# Patient Record
(demographics unavailable — no encounter records)

---

## 2024-10-08 NOTE — CONSULT LETTER
[Dear  ___] : Dear  [unfilled], [Courtesy Letter:] : I had the pleasure of seeing your patient, [unfilled], in my office today. [Please see my note below.] : Please see my note below. [Consult Closing:] : Thank you very much for allowing me to participate in the care of this patient.  If you have any questions, please do not hesitate to contact me. [Sincerely,] : Sincerely, [FreeTextEntry2] : Dr. Stanley Levin  [address] [place] [address] [address], [place] [address]   [phone] [FreeTextEntry3] : Shanthi Blount MD Pediatric Otolaryngology / Head and Neck Surgery    [facility] [address] [place] [address] Tel [phone] Fax [phone]    1 [facility] [address] [place] [address]  Tel [phone] Fax [phone]

## 2024-10-08 NOTE — HISTORY OF PRESENT ILLNESS
[de-identified] : Today I had the pleasure of seeing NILSON SALEEM for follow up.  History was obtained from patient, mother and chart.  Hx of ADHD and asthma- well controlled (missael Lou NP) PSG 9/20/24 Very mild BUCK. Sleep efficiency 87.5%, oAHI 1.1/hr, nissa 92%.   Snoring remains the same, worse on her back  Occasional gasping

## 2024-10-08 NOTE — ASSESSMENT
[FreeTextEntry1] : NILSON is a 7 year old girl presenting for asthma control, nasal congestion, sleep disordered breathing  Nasal Congestion, sleep disordered breathing mild BUCK - discussed options plan for observation and follow up in 6 months if persists offered T&A, will reach out to pulm to see if candidate for singulair as well  Consent for Tonsillectomy and Adenoidectomy The risks, benefits and alternatives of tonsillectomy and adenoidectomy were discussed.   The risks of tonsillectomy include but are not limited to: bleeding, which can range from mild requiring observation to more serious or life-threatening bleeding necessitating hospitalization, blood transfusion, and/or return to the operating room for control; voice change, infection, pain, dehydration, swallowing difficulty, need for additional surgery, nasal regurgitation and risk of anesthesia (which will be discussed by the anesthesiologist). Benefits in the case of recurrent tonsillopharyngitis include a reduction (but not necessarily a complete cure) in the number of throat infections, and in the case of obstructive sleep apnea (BUCK) include a decrease in severity of BUCK, which can be curative, but in many cases residual BUCK may occur. Alternatives in the case of recurrent tonsillopharyngitis include observation and continued antibiotic treatment, and in the case of BUCK observation, medical therapy, Continuous Positive Airway Pressure(CPAP), Bilevel Positive Airway Pressure (BiPAP) other surgical options. Non-treatment of BUCK is associated with decreased sleep and its sequelae, and in severe cases can have cardiovascular complications.  The risks, benefits and alternatives of adenoidectomy were discussed. The risks include but are not limited to: bleeding, which can range from mild requiring observation to more serious necessitating hospitalization, blood transfusion, return to the operating room for control and in extreme cases death; voice change- specifically velopharyngeal insufficiency which can affect the nasal resonance; infection, pain, dehydration, swallowing difficulty, need for additional surgery, nasal regurgitation and risk of anesthesia (which will be discussed by the anesthesiologist). Benefits in the case of recurrent adenoiditis include a reduction (but not necessarily a complete cure) in the number of adenoid infections; in the case of nasal obstruction an improvement of nasal airway and decreased rhinorrhea; and in the case of obstructive sleep apnea (BUCK) include a decrease in severity of BUCK, which can be curative, but in many cases residual BUCK may occur. Alternatives in the case of recurrent adenoiditis include observation and continued antibiotic treatment; in the case of nasal obstruction observation or medical therapy including but not limited to antihistamines, intranasal/systemic steroids; and in the case of BUCK observation, medical therapy, Continuous Positive Airway Pressure(CPAP), Bilevel Positive Airway Pressure (BiPAP) other surgical options. Non-treatment of BUCK is associated with decreased sleep and its sequelae, and in severe cases can have cardiovascular complications.

## 2024-10-08 NOTE — PHYSICAL EXAM
[Partial] : partial cerumen impaction [Effusion] : no effusion [Exposed Vessel] : left anterior vessel not exposed [Increased Work of Breathing] : no increased work of breathing with use of accessory muscles and retractions [Normal Gait and Station] : normal gait and station [Normal muscle strength, symmetry and tone of facial, head and neck musculature] : normal muscle strength, symmetry and tone of facial, head and neck musculature [Normal] : no cervical lymphadenopathy [de-identified] : 2+ endophytic

## 2024-10-31 NOTE — REVIEW OF SYSTEMS
[NI] : Genitourinary  [Nl] : Hematologic/Lymphatic [Snoring] : snoring [Wheezing] : wheezing [Cough] : cough [Hyperactive] : hyperactive behavior [Immunizations are up to date] : Immunizations are up to date [Fever] : no fever [Apnea] : no apnea [Nasal Congestion] : nasal congestion [Heart Disease] : no heart disease [Shortness of Breath] : no shortness of breath [Pneumonia] : no pneumonia [Problems Swallowing] : no problems swallowing [Eczema] : no ezcema [FreeTextEntry6] : no current wheeze. +infrequent cough [de-identified] : obese [Influenza Vaccine this Past Year] : no influenza vaccine this past year

## 2024-10-31 NOTE — BIRTH HISTORY
[At Term] : at term [de-identified] : NICU stay x1 month due to maternal opiod use. No resp distress.  [FreeTextEntry1] : Adopted at birth.

## 2024-10-31 NOTE — SOCIAL HISTORY
[Mother] : mother [Grade:  _____] : Grade: [unfilled] [House] : [unfilled] lives in a house  [Cat] : cat [Bedroom] : not in the bedroom [Living Area] : not in the living area [Smokers in Household] : there are no smokers in the home [de-identified] : 1 cat

## 2024-10-31 NOTE — PHYSICAL EXAM
[Alert] : ~L alert [Active] : active [Normal Breathing Pattern] : normal breathing pattern [No Respiratory Distress] : no respiratory distress [No Nasal Drainage] : no nasal drainage [No Oral Cyanosis] : no oral cyanosis [No Stridor] : no stridor [Absence Of Retractions] : absence of retractions [Symmetric] : symmetric [Good Expansion] : good expansion [No Acc Muscle Use] : no accessory muscle use [Good aeration to bases] : good aeration to bases [Equal Breath Sounds] : equal breath sounds bilaterally [No Crackles] : no crackles [No Rhonchi] : no rhonchi [No Wheezing] : no wheezing [Normal Sinus Rhythm] : normal sinus rhythm [Soft, Non-Tender] : soft, non-tender [Full ROM] : full range of motion [No Clubbing] : no clubbing [Capillary Refill < 2 secs] : capillary refill less than two seconds [No Cyanosis] : no cyanosis [Abnormal Walk] : normal gait [Alert and  Oriented] : alert and oriented [FreeTextEntry1] : obese [FreeTextEntry5] : +postnasal drip.  [FreeTextEntry7] : infrequent cough.

## 2024-10-31 NOTE — HISTORY OF PRESENT ILLNESS
[FreeTextEntry1] : Asthma, ADHD, obesity, snoring and chronic nasal congestion. Non-allergic rhinitis (SPT negative, May 2024).  Adopted at birth. Biological family hx not known. NICU stay x 1 month (opioid weaning). PS2024: mild BUCK: AHI: 1.1/hr; O2 Roni: 92%.  ENT eval 10/2024, Dr. Blount: possible T&A, consider Singulair.    FOLLOW UP - Oct 31, 2024 Last seen 2024, Dr. Bernal: Recs:  ICS stepped up to: Fluticasone Propionate HFA 110mcg 2 puffs BID, OCS, ABX for AOM, PSG  Interval hx: - Overall doing well until 3 days ago. +cough and nasal congestion. Denies h/o fever.  - reports compliance with ICS and spacer.  - Albuterol PRN, about every 4-6 hours during the day (not used overnight). Last dose today at 8am.  - No interval oral steroids. - No interval ER visits/hospitalizations. - ENT eval 10/2024 with Dr. Blount: possible T&A, consider Singulair. mother open to trying singulair.   Daily meds: Fluticasone Propionate HFA 110mcg 2 puffs BID  Rescue meds: albuterol PRN.  Baseline daytime cough, SOB or wheeze: denies. well between episodes.  Baseline nocturnal cough, SOB or wheeze: denies. well between episodes.  Exertional cough, SOB or wheeze: denies (swim class without issue).  BUCK sx: +snoring. PSG mild BUCK. Follows with ENT.  DANIEL sx: denies  Allergic rhinitis symptoms:  denies  Flu Vaccine - :  Pending for -  season. will schedule with PCP when feeling better.   Modified Asthma Predictive Index (mAPI): 4 wheezing illnesses AND 1 major criteria Parental asthma: unknown biologic family hx.  atopic dermatitis: NO Aeroallergen sensitization suspected: YES   OR   2 minor criteria Food sensitization: NO Peripheral blood eosinophilia =4%: Wheezing apart from colds:  NO

## 2024-12-12 NOTE — PHYSICAL EXAM
[Alert] : alert [No Acute Distress] : no acute distress [Cooperative] : cooperative [Normocephalic] : normocephalic [Conjunctivae with no discharge] : conjunctivae with no discharge [PERRL] : PERRL [Clear Tympanic membranes with present light reflex and bony landmarks] : clear tympanic membranes with present light reflex and bony landmarks [Uvula Midline] : uvula midline [Nonerythematous Oropharynx] : nonerythematous oropharynx [Supple, full passive range of motion] : supple, full passive range of motion [No Palpable Masses] : no palpable masses [Regular Rate and Rhythm] : regular rate and rhythm [Normal S1, S2 present] : normal S1, S2 present [No Murmurs] : no murmurs [Soft] : soft [NonTender] : non tender [Normoactive Bowel Sounds] : normoactive bowel sounds [Mio: _____] : Mio [unfilled] [No pain or deformities with palpation of bone, muscles, joints] : no pain or deformities with palpation of bone, muscles, joints [Normal Muscle Tone] : normal muscle tone [Straight] : straight [No Rash or Lesions] : no rash or lesions [FreeTextEntry9] : obese abdomen [FreeTextEntry1] : pleasant, slightly inattentive [FreeTextEntry4] : pale nasal turbinates, clear rhinorrhea [de-identified] : multiple caries; tonsils 2+ bilaterally  [FreeTextEntry7] : breathing comfortably; crackles and faint wheezing over R lung (no change after coughing) [de-identified] : increased adipose tissue [de-identified] : grossly normal  [de-identified] : no active eczema

## 2024-12-12 NOTE — DISCUSSION/SUMMARY
[Normal Sleep Pattern] : sleep [Excessive Weight Gain] : excessive weight gain [BMI ___] : body mass index of [unfilled] [Delayed Fine Motor Skills] : delayed fine motor skills [Delayed Language Skills] : delayed language skills [Picky Eater] : picky eater [Patient] : patient [Mother] : mother [Full Activity without restrictions including Physical Education & Athletics] : Full Activity without restrictions including Physical Education & Athletics [I have examined the above-named student and completed the preparticipation physical evaluation. The athlete does not present apparent clinical contraindications to practice and participate in sport(s) as outlined above. A copy of the physical exam is on r] : I have examined the above-named student and completed the preparticipation physical evaluation. The athlete does not present apparent clinical contraindications to practice and participate in sport(s) as outlined above. A copy of the physical exam is on record in my office and can be made available to the school at the request of the parents. If conditions arise after the athlete has been cleared for participation, the physician may rescind the clearance until the problem is resolved and the potential consequences are completely explained to the athlete (and parents/guardians). [FreeTextEntry3] : FAMILY CARDIAC HISTORY REVIEWED (cardiac screen negative) [] : The components of the vaccine(s) to be administered today are listed in the plan of care. The disease(s) for which the vaccine(s) are intended to prevent and the risks have been discussed with the caretaker.  The risks are also included in the appropriate vaccination information statements which have been provided to the patient's caregiver.  The caregiver has given consent to vaccinate. [FreeTextEntry1] :  Nancie 7 year old girl with autism, mild developmental delays, ADHD, mild persistent asthma, SDB (very mild BUCK on recent PSG), and strabismus  Hx of multiple UTIs in 2023 (1 of which was febrile), likely due to urine withholding and chronic constipation; renal U/S and EMG flow study were normal, constipation is well-managed with miralax daily Excessive weight gain of 16 lb in the last year, clearly due to excess caloric intake and inadequate physical activity; BMI remains above 99%ile Compliant with Fluticasone 110mcg 2 puffs BID (s/p ICS step-up in July) and Montelukast (as recommended by ENT and Pulm); asthma overall well-controlled, no oral steroid courses since July Acute cough for 5 days in context of afebrile URI; exam findings are c/w clinical PNA of R lung Exam also notable for pale nasal mucosa; highly suspect allergic rhinitis despite negative SPT as child had taken oral antihistamine prior to testing  1) Health maintenance - Discussed child safety - Routine F/U with Dentist and Ophtho - Flu & COVID vaccines administered by RN  2) Autism/ADHD - Continue school-based therapies (ST, OT, counseling) - F/U with B&D (overdue)  3) Mild persistent asthma (well-controlled) - Continue Fluticasone 110mcg 2 puffs BID and Singulair 5 mg QHS - Take Albuterol q4h PRN, initiate at the beginning of URI - F/U with Pulm in Jan 2025  4) Obesity - Extensive dietary and exercise counseling provided - Screening labs ordered  - Nutritionist referral  5) Snoring/ Chronic nasal congestion - Take Claritin and Flonase PRN - Continue Singulair 5mg QHS - F/U with ENT   6) Recurrent UTIs (none in 2024) - Reviewed proper  hygiene and importance of timely voiding - Continue Miralax 1/2 capful daily for constipation - F/U with Uro PRN (parent deferred VCUG which is reasonable given normal renal U/S)  7) R lung PNA - Prescribed high-dose Amox 10 day course - RVP to assess for Mycoplasma or Pertussis infection - Return in 2 weeks for F/U (to ensure appropriate treatment)

## 2024-12-12 NOTE — PHYSICAL EXAM
[Alert] : alert [No Acute Distress] : no acute distress [Cooperative] : cooperative [Normocephalic] : normocephalic [Conjunctivae with no discharge] : conjunctivae with no discharge [PERRL] : PERRL [Clear Tympanic membranes with present light reflex and bony landmarks] : clear tympanic membranes with present light reflex and bony landmarks [Uvula Midline] : uvula midline [Nonerythematous Oropharynx] : nonerythematous oropharynx [Supple, full passive range of motion] : supple, full passive range of motion [No Palpable Masses] : no palpable masses [Regular Rate and Rhythm] : regular rate and rhythm [Normal S1, S2 present] : normal S1, S2 present [No Murmurs] : no murmurs [Soft] : soft [NonTender] : non tender [Normoactive Bowel Sounds] : normoactive bowel sounds [Mio: _____] : Mio [unfilled] [No pain or deformities with palpation of bone, muscles, joints] : no pain or deformities with palpation of bone, muscles, joints [Normal Muscle Tone] : normal muscle tone [Straight] : straight [No Rash or Lesions] : no rash or lesions [FreeTextEntry9] : obese abdomen [FreeTextEntry1] : pleasant, slightly inattentive [FreeTextEntry4] : pale nasal turbinates, clear rhinorrhea [de-identified] : multiple caries; tonsils 2+ bilaterally  [FreeTextEntry7] : breathing comfortably; crackles and faint wheezing over R lung (no change after coughing) [de-identified] : increased adipose tissue [de-identified] : grossly normal  [de-identified] : no active eczema

## 2024-12-12 NOTE — HISTORY OF PRESENT ILLNESS
[Yes] : Patient goes to dentist yearly [Appropiate parent-child-sibling interaction] : appropriate parent-child-sibling interaction [Has Friends] : has friends [Grade ___] : Grade [unfilled] [No] : No cigarette smoke exposure [Appropriately restrained in motor vehicle] : appropriately restrained in motor vehicle [___ stools per day] : [unfilled]  stools per day [Toilet Trained] : toilet trained [In own bed] : In own bed [Mother] : mother [Brushing teeth twice/d] : brushing teeth twice per day [Participates in after-school activities] : participates in after-school activities [Adequate social interactions] : adequate social interactions [Adequate behavior] : adequate behavior [Adequate performance] : adequate performance [Parent discusses safety rules regarding adults] : parent discusses safety rules regarding adults [Exposure to electronic nicotine delivery system] : No exposure to electronic nicotine delivery system [Up to date] : Up to date [de-identified] : nasal congestion/discharge, cough for 5 days, no fever but did take meds due to not feeling well this week, taking Flonase 1 spray 1-2x/day, Albuterol & NS nebs every 4 hours (including at school) doesn't require Albuterol overnight compliant with Fluticasone 110mcg 2 puffs BID  [de-identified] : diet consists of carbs, meat, fruits, low-fat yogurt, ice cream; drinks unsweetened almond milk and no sugar-added cranberry juice [FreeTextEntry8] : constipation is well-managed with daily miralax  [FreeTextEntry3] : sleeps well; snoring improved with consistent use of flonase  [de-identified] : awaiting sedated procedure for treatment of multiple cavities  [FreeTextEntry9] : exercise includes dance, swimming and daily play with her 6 year old nephew  [de-identified] : IEP: [employer] classroom, ST, OT, counseling; does well in school, reads very well [FreeTextEntry1] :  SDB PSG 9/20/24 very mild BUCK Persistent snoring, occasional gasping ENT appt in Oct 2024: continue observation, F/U in 6 months and consider T&A if sx persist  Mild persistent asthma ICS step-up in July to Fluticasone 110mcg 2 puffs BID, prescribed oral steroid, antibiotic for AOM Pulm appt in Oct 2024: recommend continuing Fluticasone 110mcg 2 puffs BID, intranasal steroid and/or antihistamine, trial Montelukast (as recommended by ENT), F/U in 2-3 months

## 2024-12-12 NOTE — HISTORY OF PRESENT ILLNESS
[Yes] : Patient goes to dentist yearly [Appropiate parent-child-sibling interaction] : appropriate parent-child-sibling interaction [Has Friends] : has friends [Grade ___] : Grade [unfilled] [No] : No cigarette smoke exposure [Appropriately restrained in motor vehicle] : appropriately restrained in motor vehicle [___ stools per day] : [unfilled]  stools per day [Toilet Trained] : toilet trained [In own bed] : In own bed [Mother] : mother [Brushing teeth twice/d] : brushing teeth twice per day [Participates in after-school activities] : participates in after-school activities [Adequate social interactions] : adequate social interactions [Adequate behavior] : adequate behavior [Adequate performance] : adequate performance [Parent discusses safety rules regarding adults] : parent discusses safety rules regarding adults [Exposure to electronic nicotine delivery system] : No exposure to electronic nicotine delivery system [Up to date] : Up to date [de-identified] : nasal congestion/discharge, cough for 5 days, no fever but did take meds due to not feeling well this week, taking Flonase 1 spray 1-2x/day, Albuterol & NS nebs every 4 hours (including at school) doesn't require Albuterol overnight compliant with Fluticasone 110mcg 2 puffs BID  [de-identified] : diet consists of carbs, meat, fruits, low-fat yogurt, ice cream; drinks unsweetened almond milk and no sugar-added cranberry juice [FreeTextEntry8] : constipation is well-managed with daily miralax  [FreeTextEntry3] : sleeps well; snoring improved with consistent use of flonase  [de-identified] : awaiting sedated procedure for treatment of multiple cavities  [FreeTextEntry9] : exercise includes dance, swimming and daily play with her 6 year old nephew  [de-identified] : IEP: [employer] classroom, ST, OT, counseling; does well in school, reads very well [FreeTextEntry1] :  SDB PSG 9/20/24 very mild BUCK Persistent snoring, occasional gasping ENT appt in Oct 2024: continue observation, F/U in 6 months and consider T&A if sx persist  Mild persistent asthma ICS step-up in July to Fluticasone 110mcg 2 puffs BID, prescribed oral steroid, antibiotic for AOM Pulm appt in Oct 2024: recommend continuing Fluticasone 110mcg 2 puffs BID, intranasal steroid and/or antihistamine, trial Montelukast (as recommended by ENT), F/U in 2-3 months

## 2024-12-12 NOTE — REVIEW OF SYSTEMS
[Fever] : no fever [Malaise] : malaise [Headache] : no headache [Ear Pain] : no ear pain [Nasal Discharge] : nasal discharge [Nasal Congestion] : nasal congestion [Sore Throat] : no sore throat [Snoring] : snoring [Dental Caries] : dental caries [Tachypnea] : not tachypneic [Wheezing] : no wheezing [Cough] : cough [Constipation] : no constipation [Dysuria] : no dysuria [Urinary Incontinence] : no urinary incontinence [Malodorous Urine] : no malodorous urine [Negative] : Genitourinary

## 2024-12-29 NOTE — PHYSICAL EXAM
[Pale Nasal Mucosa] : pale nasal mucosa [Hypertrophied Nasal Mucosa] : hypertrophied nasal mucosa [Enlarged Tonsils] : enlarged tonsils [Cobblestoning] : cobblestoning of posterior pharynx [NL] : clear to auscultation bilaterally [Regular Rate and Rhythm] : regular rate and rhythm [Normal S1, S2 audible] : normal S1, S2 audible [Soft] : soft [Moves All Extremities x 4] : moves all extremities x4 [Warm, Well Perfused x4] : warm, well perfused x4 [Conjuctival Injection] : no conjunctival injection [Erythema] : no erythema [Palate petechiae] : palate without petechiae [Wheezing] : no wheezing [Crackles] : no crackles [Rhonchi] : no rhonchi [Tender] : nontender [No Abnormal Lymph Nodes Palpated] : no abnormal lymph nodes palpated [FreeTextEntry1] : well-appearing, pleasant, slightly hyperactive but cooperative [FreeTextEntry3] : dullness of b/l TMs [FreeTextEntry7] : breathing comfortably

## 2024-12-29 NOTE — HISTORY OF PRESENT ILLNESS
[FreeTextEntry6] : Completed 10 day course of Amox on 12/20 Took probiotic daily, denies antibiotic GI side effects No fever Cough had resolved with antibiotic course  Taking Albuterol PRN, only 3x over the last couple of weeks Last took Albuterol 2 days ago due to cough (in context of cold air exposure) Denies coughing fits, wheezing or SOB Not taking ICS (Fluticasone 110mcg 2 puffs BID) consistently Resumed Singulair last weekend (had self-discontinued temporarily while taking antibiotic)

## 2024-12-29 NOTE — REVIEW OF SYSTEMS
[Nasal Congestion] : nasal congestion [Cough] : cough [Fever] : no fever [Difficulty with Sleep] : no difficulty with sleep [Headache] : no headache [Ear Pain] : no ear pain [Sore Throat] : no sore throat [Tachypnea] : not tachypneic [Wheezing] : no wheezing [Shortness of Breath] : no shortness of breath [Appetite Changes] : no appetite changes [Vomiting] : no vomiting [Diarrhea] : no diarrhea

## 2024-12-29 NOTE — DISCUSSION/SUMMARY
[FreeTextEntry1] :  Nancie 7 year old girl with autism, mild developmental delays, ADHD, mild persistent asthma, SDB (very mild BUCK on recent PSG) presents for F/U  Dx with clinical PNA of R lung on 12/11 (presented with acute cough in context of afebrile URI, RVP + parainfluenza and coronavirus); completed 10 day course of high-dose Amox with resolution of sx Asthma controller meds include Montelukast (as recommended by ENT and Pulm) and Fluticasone 110mcg 2 puffs BID (s/p ICS step-up in July); asthma overall well-controlled, no oral steroid courses since July  1) Mild persistent asthma (well-controlled) - Continue Fluticasone 110mcg 2 puffs BID and Singulair 5 mg QHS - Take Albuterol q4h PRN, initiate at the beginning of URI - F/U with Pulm in Jan 2025  2) Obesity - Screening labs ordered  - Nutritionist referral  3) Snoring/ Chronic nasal congestion - Take Claritin and Flonase PRN - Continue Singulair 5mg QHS - F/U with ENT

## 2025-02-10 NOTE — SOCIAL HISTORY
[Mother] : mother [Grade:  _____] : Grade: [unfilled] [House] : [unfilled] lives in a house  [Cat] : cat [Bedroom] : not in the bedroom [Living Area] : not in the living area [Smokers in Household] : there are no smokers in the home [de-identified] : 1 cat

## 2025-02-10 NOTE — HISTORY OF PRESENT ILLNESS
[FreeTextEntry1] : Asthma, Autism spectrum disorder, ADHD, obesity, snoring and chronic nasal congestion. Non-allergic rhinitis (SPT negative, May 2024).  Adopted at birth. Biological family hx not known. NICU stay x 1 month (opioid weaning). Mild BUCK. PSG obtained 9/2024: AHI: 1.1/hr; O2 Roni: 92%.  ENT eval 10/2024, Dr. Blount: possible T&A, f/u appt April 2025.  Clinical PNA (Right lung): Dec 2024 dx by PCP. Completed 10 day course of high-dose Amoxicillin. +Parainfluenza and Coronavirus.     FOLLOW UP - Feb 10, 2025 Last seen Oct 2024, Recs: Fluticasone Propionate HFA 110mcg 2 puffs BID, Singulair 5mg QPm, albuterol PRN  Interval hx: - Overall doing well. ACT = 24.  - Treated for clinical PNA by PCP in Dec 2024.  - Now with URI symptoms for the past week. No fever.  - Continues to have chronic nasal congestion. ENT f/u in April 2025. Mother administering Flonase with good effect.  - reports compliance with ICS and spacer.  - no behavioral/mood side effects noted on Singulair.  - No interval oral steroids. - No interval ER visits/hospitalizations. Daily meds: Fluticasone Propionate HFA 110mcg 2 puffs BID, Singulair 5mg QPM Rescue meds: albuterol PRN. Last used yesterday pm. using 3-4 times a day in the past week with URI sxs.  Baseline daytime cough, SOB or wheeze: intermittent. otherwise well between episodes.  Baseline nocturnal cough, SOB or wheeze: infrequent. well between episodes.  Exertional cough, SOB or wheeze: denies (attends swim class without issue).  BUCK sx: +snoring. Symptoms improved with Flonase.  DANIEL sx: denies  Allergic rhinitis symptoms:  denies  Flu Vaccine 2024- 2025:  YES.   Modified Asthma Predictive Index (mAPI): 4 wheezing illnesses AND 1 major criteria Parental asthma: unknown biologic family hx.  atopic dermatitis: NO Aeroallergen sensitization suspected: YES   OR   2 minor criteria Food sensitization: NO Peripheral blood eosinophilia =4%: Wheezing apart from colds:  NO

## 2025-02-10 NOTE — BIRTH HISTORY
[At Term] : at term [de-identified] : NICU stay x1 month due to maternal opiod use. No resp distress.  [FreeTextEntry1] : Adopted at birth.

## 2025-02-10 NOTE — SOCIAL HISTORY
[Mother] : mother [Grade:  _____] : Grade: [unfilled] [House] : [unfilled] lives in a house  [Cat] : cat [Bedroom] : not in the bedroom [Living Area] : not in the living area [Smokers in Household] : there are no smokers in the home [de-identified] : 1 cat

## 2025-02-10 NOTE — REVIEW OF SYSTEMS
[NI] : Genitourinary  [Nl] : Hematologic/Lymphatic [Snoring] : snoring [Nasal Congestion] : nasal congestion [Wheezing] : wheezing [Cough] : cough [Hyperactive] : hyperactive behavior [Immunizations are up to date] : Immunizations are up to date [Fever] : no fever [Apnea] : no apnea [Rhinorrhea] : rhinorrhea [Heart Disease] : no heart disease [Shortness of Breath] : no shortness of breath [Pneumonia] : no pneumonia [Problems Swallowing] : no problems swallowing [Eczema] : no ezcema [FreeTextEntry4] : mild BUCK. PSG Sept 2024: AHI: 1.1/hr; O2 Roni: 92%. [FreeTextEntry6] : no current wheeze. +infrequent cough [de-identified] : SPT negative, May 2024  [de-identified] : obese [Influenza Vaccine this Past Year] : influenza vaccine this past year

## 2025-02-10 NOTE — BIRTH HISTORY
[At Term] : at term [de-identified] : NICU stay x1 month due to maternal opiod use. No resp distress.  [FreeTextEntry1] : Adopted at birth.

## 2025-02-10 NOTE — REVIEW OF SYSTEMS
[NI] : Genitourinary  [Nl] : Hematologic/Lymphatic [Snoring] : snoring [Nasal Congestion] : nasal congestion [Wheezing] : wheezing [Cough] : cough [Hyperactive] : hyperactive behavior [Immunizations are up to date] : Immunizations are up to date [Fever] : no fever [Apnea] : no apnea [Rhinorrhea] : rhinorrhea [Heart Disease] : no heart disease [Shortness of Breath] : no shortness of breath [Pneumonia] : no pneumonia [Problems Swallowing] : no problems swallowing [Eczema] : no ezcema [FreeTextEntry4] : mild BUCK. PSG Sept 2024: AHI: 1.1/hr; O2 Roni: 92%. [FreeTextEntry6] : no current wheeze. +infrequent cough [de-identified] : SPT negative, May 2024  [de-identified] : obese [Influenza Vaccine this Past Year] : influenza vaccine this past year

## 2025-02-10 NOTE — PHYSICAL EXAM
[Well Developed] : well developed [Well Groomed] : well groomed [Alert] : ~L alert [Active] : active [Normal Breathing Pattern] : normal breathing pattern [No Respiratory Distress] : no respiratory distress [No Oral Cyanosis] : no oral cyanosis [Tonsil Size ___] : tonsil size [unfilled] [No Stridor] : no stridor [Absence Of Retractions] : absence of retractions [Symmetric] : symmetric [Good Expansion] : good expansion [No Acc Muscle Use] : no accessory muscle use [Good aeration to bases] : good aeration to bases [Equal Breath Sounds] : equal breath sounds bilaterally [No Crackles] : no crackles [No Rhonchi] : no rhonchi [No Wheezing] : no wheezing [Normal Sinus Rhythm] : normal sinus rhythm [Soft, Non-Tender] : soft, non-tender [Full ROM] : full range of motion [No Clubbing] : no clubbing [Capillary Refill < 2 secs] : capillary refill less than two seconds [No Cyanosis] : no cyanosis [Abnormal Walk] : normal gait [Alert and  Oriented] : alert and oriented [FreeTextEntry1] : +overweight [FreeTextEntry2] : +eyeglasses [FreeTextEntry4] : +thick white nasal drainage.  [FreeTextEntry5] : +erythematous pharynx.

## 2025-06-03 NOTE — BIRTH HISTORY
[At Term] : at term [de-identified] : NICU stay x1 month due to maternal opiod use. No resp distress.  [FreeTextEntry1] : Adopted at birth.

## 2025-06-03 NOTE — SOCIAL HISTORY
[Mother] : mother [Grade:  _____] : Grade: [unfilled] [House] : [unfilled] lives in a house  [Bedroom] : not in the bedroom [Living Area] : not in the living area [Cat] : cat [Smokers in Household] : there are no smokers in the home [de-identified] : 1 cat

## 2025-06-03 NOTE — REVIEW OF SYSTEMS
[NI] : Genitourinary  [Nl] : Hematologic/Lymphatic [Fever] : no fever [Snoring] : snoring [Apnea] : no apnea [Rhinorrhea] : rhinorrhea [Nasal Congestion] : nasal congestion [Heart Disease] : no heart disease [Wheezing] : wheezing [Cough] : cough [Shortness of Breath] : no shortness of breath [Pneumonia] : no pneumonia [Problems Swallowing] : no problems swallowing [Eczema] : no ezcema [Hyperactive] : hyperactive behavior [FreeTextEntry4] : mild BUCK. PSG Sept 2024: AHI: 1.1/hr; O2 Roni: 92%. [FreeTextEntry6] : no current wheeze. +infrequent cough [de-identified] : SPT negative, May 2024  [de-identified] : obese [Immunizations are up to date] : Immunizations are up to date [Influenza Vaccine this Past Year] : influenza vaccine this past year

## 2025-06-03 NOTE — BIRTH HISTORY
[At Term] : at term [de-identified] : NICU stay x1 month due to maternal opiod use. No resp distress.  [FreeTextEntry1] : Adopted at birth.

## 2025-06-03 NOTE — HISTORY OF PRESENT ILLNESS
[FreeTextEntry1] : Asthma, Autism spectrum disorder, ADHD, obesity, snoring and chronic nasal congestion. Non-allergic rhinitis (SPT negative, May 2024).  Adopted at birth. Biological family hx not known. NICU stay x 1 month (opioid weaning). Mild BUCK. PSG obtained 9/2024: AHI: 1.1/hr; O2 Roni: 92%.  ENT eval 10/2024, Dr. Blount: possible T&A, f/u appt April 2025.  Clinical PNA (Right lung): Dec 2024 dx by PCP. Completed 10 day course of high-dose Amoxicillin. +Parainfluenza and Coronavirus.     FOLLOW UP - Jun 05, 2025 Last seen Feb 2025. Recs: Continue: Fluticasone Propionate HFA 110mcg 2 puffs BID, Singulair 5mg QPm, albuterol wean, RVP and strep swab. ENT follow up and nutrition f/u.   Interval hx: - Strep was + last visit, treated.  - Overall doing  - ACT:  - Treated for clinical PNA by PCP in Dec 2024.  - Now with URI symptoms for the past week. No fever.  - Continues to have chronic nasal congestion. ENT f/u in April 2025. Mother administering Flonase with good effect.  - reports compliance with ICS and spacer.  - no behavioral/mood side effects noted on Singulair.   - No interval oral steroids. - No interval ER visits/hospitalizations.  Daily meds: Fluticasone Propionate HFA 110mcg 2 puffs BID, Singulair 5mg QPM Rescue meds: albuterol PRN. Last used yesterday pm. using 3-4 times a day in the past week with URI sxs.  Baseline daytime cough, SOB or wheeze: intermittent. otherwise well between episodes.  Baseline nocturnal cough, SOB or wheeze: infrequent. well between episodes.  Exertional cough, SOB or wheeze: denies (attends swim class without issue).  BUCK sx: +snoring. Symptoms improved with Flonase.  DANIEL sx: denies  Allergic rhinitis symptoms:  denies  Flu Vaccine 2024- 2025:  YES.   Modified Asthma Predictive Index (mAPI): 4 wheezing illnesses AND 1 major criteria Parental asthma: unknown biologic family hx.  atopic dermatitis: NO Aeroallergen sensitization suspected: YES   OR   2 minor criteria Food sensitization: NO Peripheral blood eosinophilia =4%: Wheezing apart from colds:  NO

## 2025-06-03 NOTE — SOCIAL HISTORY
[Mother] : mother [Grade:  _____] : Grade: [unfilled] [House] : [unfilled] lives in a house  [Bedroom] : not in the bedroom [Living Area] : not in the living area [Cat] : cat [Smokers in Household] : there are no smokers in the home [de-identified] : 1 cat

## 2025-06-03 NOTE — REVIEW OF SYSTEMS
[NI] : Genitourinary  [Nl] : Hematologic/Lymphatic [Fever] : no fever [Snoring] : snoring [Apnea] : no apnea [Rhinorrhea] : rhinorrhea [Nasal Congestion] : nasal congestion [Heart Disease] : no heart disease [Wheezing] : wheezing [Cough] : cough [Shortness of Breath] : no shortness of breath [Pneumonia] : no pneumonia [Problems Swallowing] : no problems swallowing [Eczema] : no ezcema [Hyperactive] : hyperactive behavior [FreeTextEntry4] : mild BUCK. PSG Sept 2024: AHI: 1.1/hr; O2 Roni: 92%. [FreeTextEntry6] : no current wheeze. +infrequent cough [de-identified] : SPT negative, May 2024  [de-identified] : obese [Immunizations are up to date] : Immunizations are up to date [Influenza Vaccine this Past Year] : influenza vaccine this past year

## 2025-07-28 NOTE — CARE PLAN
[Care Plan reviewed and provided to patient/caregiver] : Care plan reviewed and provided to patient/caregiver [FreeTextEntry3] : oral Amoxicillin 12.5ml every 12 hours for acute purulent AOM x 10 days. Continue fluticasone full dose (2 puffs BID) for total of 5 days for cough, then continue 1 puff BID dose as per pulmonology recs for persistent asthma. Continue daily Zyrtec. probiotics recommended during oral antibiotic use to promote gut health.

## 2025-07-29 NOTE — DISCUSSION/SUMMARY
[FreeTextEntry1] :  Prescribed Amoxicillin 12.5ml every 12 hours for acute purulent AOM x 10 days. Continue Fluticasone 110mcg full dose (2 puffs BID) and Albuterol 2 puffs OR 1 nebulizer every 4 hours around-the-clock (while awake) for total of 5 days for cough, then continue Fluticasone 1 puff BID dose as per pulmonology recs for persistent asthma. Continue daily Zyrtec. Probiotics recommended during oral antibiotic use to promote gut health.

## 2025-07-29 NOTE — REVIEW OF SYSTEMS
[Eye Discharge] : eye discharge [Nasal Discharge] : nasal discharge [Nasal Congestion] : nasal congestion [Dental Caries] : dental caries [Cough] : cough [Congestion] : congestion [Negative] : Skin [Fever] : no fever [Chills] : no chills [Malaise] : no malaise [Headache] : no headache [Eye Redness] : no eye redness [Ear Pain] : no ear pain [Snoring] : snoring [Sore Throat] : no sore throat [Chest Pain] : no chest pain [Tachypnea] : not tachypneic [Wheezing] : no wheezing [Shortness of Breath] : no shortness of breath [Appetite Changes] : no appetite changes [Vomiting] : no vomiting [Diarrhea] : no diarrhea [Abdominal Pain] : no abdominal pain [Rash] : no rash [Urine Volume has Decreased] : urine volume has not decreased

## 2025-07-29 NOTE — END OF VISIT
[FreeTextEntry3] : no fever, denies ear pain however exam c/w b/l AOM with purulent NIKO and TM erythema taking Flonase and Zyrtec consistently per mother prescribed Amox 1g BID 10 day course for b/l AOM upcoming ENT appt in Aug  mild asthma exacerbation (cough including nocturnal cough) but no SOB or wheezing sx responded well to Albuterol 2x/day for past 3 days no indication for oral steroid at this time  continue Flovent 2 puffs BID and Albuterol ATC for 5 days during acute URI, then wean Flovent to 1 puff BID for remainder of summer as per Pulm and wean Albuterol to q4h PRN reiterated superiority of inhaler w/ spacer to nebs continue NS nebs PRN congestion upcoming Pulm appt in Aug [] : A student assisted with documenting this visit. I have reviewed and verified all information documented by the student, and made modifications to such information, when appropriate. [Time Spent: ___ minutes] : I have spent [unfilled] minutes of time on the encounter which excludes teaching and separately reported services.

## 2025-07-29 NOTE — HISTORY OF PRESENT ILLNESS
[EENT/Resp Symptoms] : EENT/RESPIRATORY SYMPTOMS [Eye discharge] : eye discharge [Runny nose] : runny nose [Nasal congestion] : nasal congestion [Cough] : cough [___ Day(s)] : [unfilled] day(s) [Intermittent] : intermittent [Dry cough] : dry cough [At Night] : at night [Nebulizer: ___] : nebulizer: [unfilled] [Inhaler with spacer: ___] : inhaler with spacer: [unfilled] [Last Treatment: _____] : last treatment: [unfilled] [Eye Discharge] : eye discharge [Nasal Congestion] : nasal congestion [Active] : active [Known Exposure to COVID-19] : no known exposure to COVID-19 [Hx of recent COVID-19 infection] : no history of recent COVID-19 infection [FreeTextEntry3] : possible sick contacts at camp [FreeTextEntry6] : 7-year-old female with pmhx of autism, asthma, ADHD, presents to clinic with cough and nasal congestion since Friday 7/25. Cough is described as a dry, barking cough, without mucous. Pt went to camp on Thursday 7/24, left early after visiting the zoo, as per mom pt felt nauseous. Had decreased appetite on Friday 7/25 but has been eating normally since. Has been giving Zyrtec daily, Albuterol nebulizer treatments BID and Fluticasone inhaler 2 puffs BID. As per mom, cough has been worse at night and has been intermittent throughout the day. Pt also has associated clear eye discharge. Mom gave pt OTC allergy drops that has provided relief. Denies fever, chills, SOB, wheezing, chest pain, ear pain, eye redness, throat pain, pain with swallowing, vomiting, diarrhea.

## 2025-07-29 NOTE — PHYSICAL EXAM
[Alert] : alert [EOMI] : grossly EOMI [Discharge] : discharge [Cerumen in canal] : cerumen in canal [Purulent Effusion] : purulent effusion [Pink Nasal Mucosa] : pink nasal mucosa [Mucoid Discharge] : mucoid discharge [Supple] : supple [Clear to Auscultation Bilaterally] : clear to auscultation bilaterally [Regular Rate and Rhythm] : regular rate and rhythm [Normal S1, S2 audible] : normal S1, S2 audible [Soft] : soft [Erythema] : erythema [Rales] : no rales [Belly Breathing] : no belly breathing [Warm] : warm [Clear] : clear [Acute Distress] : no acute distress [Tired appearing] : not tired appearing [Lethargic] : not lethargic [Conjuctival Injection] : no conjunctival injection [Eyelid Swelling] : no eyelid swelling [Hypertrophied Nasal Mucosa] : hypertrophied nasal mucosa [Erythematous Oropharynx] : nonerythematous oropharynx [Enlarged Tonsils] : enlarged tonsils [Wheezing] : no wheezing [Crackles] : no crackles [Rhonchi] : no rhonchi [Tender] : nontender [Distended] : nondistended [Normal Bowel Sounds] : normal bowel sounds [No Abnormal Lymph Nodes Palpated] : no abnormal lymph nodes palpated [Warm, Well Perfused x4] : warm, well perfused x4 [FreeTextEntry1] : extremely well-appearing, playful [FreeTextEntry5] : clear mucous discharge  [FreeTextEntry7] : breathing comfortably, normal air entry